# Patient Record
Sex: FEMALE | Race: BLACK OR AFRICAN AMERICAN | Employment: FULL TIME | URBAN - METROPOLITAN AREA
[De-identification: names, ages, dates, MRNs, and addresses within clinical notes are randomized per-mention and may not be internally consistent; named-entity substitution may affect disease eponyms.]

---

## 2020-01-31 LAB
CHLAMYDIA, EXTERNAL: NORMAL
HBSAG, EXTERNAL: NORMAL
HIV, EXTERNAL: NORMAL
N. GONORRHEA, EXTERNAL: NORMAL
RPR, EXTERNAL: NORMAL
RUBELLA, EXTERNAL: NORMAL
TYPE, ABO & RH, EXTERNAL: NORMAL

## 2020-08-14 LAB — GRBS, EXTERNAL: NORMAL

## 2020-08-24 ENCOUNTER — ANESTHESIA (OUTPATIENT)
Dept: LABOR AND DELIVERY | Age: 30
End: 2020-08-24
Payer: COMMERCIAL

## 2020-08-24 ENCOUNTER — ANESTHESIA EVENT (OUTPATIENT)
Dept: LABOR AND DELIVERY | Age: 30
End: 2020-08-24
Payer: COMMERCIAL

## 2020-08-24 ENCOUNTER — HOSPITAL ENCOUNTER (INPATIENT)
Age: 30
LOS: 3 days | Discharge: HOME | End: 2020-08-27
Attending: OBSTETRICS & GYNECOLOGY | Admitting: OBSTETRICS & GYNECOLOGY
Payer: COMMERCIAL

## 2020-08-24 DIAGNOSIS — R52 PAIN: Primary | ICD-10-CM

## 2020-08-24 PROBLEM — R10.9 ABDOMINAL PAIN DURING PREGNANCY, THIRD TRIMESTER: Status: ACTIVE | Noted: 2020-08-24

## 2020-08-24 PROBLEM — O26.893 ABDOMINAL PAIN DURING PREGNANCY, THIRD TRIMESTER: Status: ACTIVE | Noted: 2020-08-24

## 2020-08-24 LAB
ERYTHROCYTE [DISTWIDTH] IN BLOOD BY AUTOMATED COUNT: 14.1 % (ref 11.5–14.5)
HCT VFR BLD AUTO: 35.7 % (ref 35–47)
HGB BLD-MCNC: 11.4 G/DL (ref 11.5–16)
MCH RBC QN AUTO: 27.7 PG (ref 26–34)
MCHC RBC AUTO-ENTMCNC: 31.9 G/DL (ref 30–36.5)
MCV RBC AUTO: 86.7 FL (ref 80–99)
NRBC # BLD: 0 K/UL (ref 0–0.01)
NRBC BLD-RTO: 0 PER 100 WBC
PLATELET # BLD AUTO: 249 K/UL (ref 150–400)
PMV BLD AUTO: 10.6 FL (ref 8.9–12.9)
RBC # BLD AUTO: 4.12 M/UL (ref 3.8–5.2)
WBC # BLD AUTO: 12.9 K/UL (ref 3.6–11)

## 2020-08-24 PROCEDURE — 99283 EMERGENCY DEPT VISIT LOW MDM: CPT

## 2020-08-24 PROCEDURE — 85027 COMPLETE CBC AUTOMATED: CPT

## 2020-08-24 PROCEDURE — 75410000002 HC LABOR FEE PER 1 HR

## 2020-08-24 PROCEDURE — 74011000250 HC RX REV CODE- 250: Performed by: ANESTHESIOLOGY

## 2020-08-24 PROCEDURE — 65270000029 HC RM PRIVATE

## 2020-08-24 PROCEDURE — 36415 COLL VENOUS BLD VENIPUNCTURE: CPT

## 2020-08-24 PROCEDURE — 80053 COMPREHEN METABOLIC PANEL: CPT

## 2020-08-24 PROCEDURE — 74011250636 HC RX REV CODE- 250/636: Performed by: ANESTHESIOLOGY

## 2020-08-24 RX ORDER — FENTANYL CITRATE 50 UG/ML
100 INJECTION, SOLUTION INTRAMUSCULAR; INTRAVENOUS ONCE
Status: ACTIVE | OUTPATIENT
Start: 2020-08-24 | End: 2020-08-25

## 2020-08-24 RX ORDER — SODIUM CHLORIDE 0.9 % (FLUSH) 0.9 %
5-40 SYRINGE (ML) INJECTION EVERY 8 HOURS
Status: DISCONTINUED | OUTPATIENT
Start: 2020-08-25 | End: 2020-08-27 | Stop reason: HOSPADM

## 2020-08-24 RX ORDER — FENTANYL/BUPIVACAINE/NS/PF 2-1250MCG
1-16 PREFILLED PUMP RESERVOIR EPIDURAL CONTINUOUS
Status: DISCONTINUED | OUTPATIENT
Start: 2020-08-24 | End: 2020-08-27 | Stop reason: HOSPADM

## 2020-08-24 RX ORDER — EPHEDRINE SULFATE/0.9% NACL/PF 50 MG/5 ML
SYRINGE (ML) INTRAVENOUS
Status: DISPENSED
Start: 2020-08-24 | End: 2020-08-25

## 2020-08-24 RX ORDER — EPHEDRINE SULFATE/0.9% NACL/PF 50 MG/5 ML
10 SYRINGE (ML) INTRAVENOUS
Status: DISCONTINUED | OUTPATIENT
Start: 2020-08-24 | End: 2020-08-25 | Stop reason: HOSPADM

## 2020-08-24 RX ORDER — SODIUM CHLORIDE 0.9 % (FLUSH) 0.9 %
5-40 SYRINGE (ML) INJECTION AS NEEDED
Status: DISCONTINUED | OUTPATIENT
Start: 2020-08-24 | End: 2020-08-27 | Stop reason: HOSPADM

## 2020-08-24 RX ORDER — LIDOCAINE HYDROCHLORIDE AND EPINEPHRINE 15; 5 MG/ML; UG/ML
INJECTION, SOLUTION EPIDURAL AS NEEDED
Status: DISCONTINUED | OUTPATIENT
Start: 2020-08-24 | End: 2020-08-25 | Stop reason: HOSPADM

## 2020-08-24 RX ORDER — SODIUM CHLORIDE, SODIUM LACTATE, POTASSIUM CHLORIDE, CALCIUM CHLORIDE 600; 310; 30; 20 MG/100ML; MG/100ML; MG/100ML; MG/100ML
100 INJECTION, SOLUTION INTRAVENOUS CONTINUOUS
Status: DISCONTINUED | OUTPATIENT
Start: 2020-08-24 | End: 2020-08-25 | Stop reason: HOSPADM

## 2020-08-24 RX ORDER — NALOXONE HYDROCHLORIDE 0.4 MG/ML
0.4 INJECTION, SOLUTION INTRAMUSCULAR; INTRAVENOUS; SUBCUTANEOUS AS NEEDED
Status: DISCONTINUED | OUTPATIENT
Start: 2020-08-24 | End: 2020-08-25 | Stop reason: HOSPADM

## 2020-08-24 RX ORDER — BUPIVACAINE HYDROCHLORIDE 2.5 MG/ML
25 INJECTION, SOLUTION EPIDURAL; INFILTRATION; INTRACAUDAL ONCE
Status: ACTIVE | OUTPATIENT
Start: 2020-08-24 | End: 2020-08-25

## 2020-08-24 RX ORDER — FENTANYL/BUPIVACAINE/NS/PF 2-1250MCG
PREFILLED PUMP RESERVOIR EPIDURAL
Status: COMPLETED
Start: 2020-08-24 | End: 2020-08-25

## 2020-08-24 RX ORDER — BUPIVACAINE HYDROCHLORIDE 2.5 MG/ML
INJECTION, SOLUTION EPIDURAL; INFILTRATION; INTRACAUDAL
Status: COMPLETED | OUTPATIENT
Start: 2020-08-24 | End: 2020-08-24

## 2020-08-24 RX ORDER — NIFEDIPINE 30 MG/1
30 TABLET, EXTENDED RELEASE ORAL DAILY
COMMUNITY

## 2020-08-24 RX ADMIN — Medication 10 ML/HR: at 22:48

## 2020-08-24 RX ADMIN — LIDOCAINE HYDROCHLORIDE,EPINEPHRINE BITARTRATE 2 ML: 15; .005 INJECTION, SOLUTION EPIDURAL; INFILTRATION; INTRACAUDAL; PERINEURAL at 22:33

## 2020-08-24 RX ADMIN — BUPIVACAINE HYDROCHLORIDE 2 ML: 2.5 INJECTION, SOLUTION EPIDURAL; INFILTRATION; INTRACAUDAL; PERINEURAL at 22:32

## 2020-08-24 RX ADMIN — BUPIVACAINE HYDROCHLORIDE 2 ML: 2.5 INJECTION, SOLUTION EPIDURAL; INFILTRATION; INTRACAUDAL; PERINEURAL at 22:30

## 2020-08-24 RX ADMIN — SODIUM CHLORIDE, SODIUM LACTATE, POTASSIUM CHLORIDE, AND CALCIUM CHLORIDE 100 ML/HR: 600; 310; 30; 20 INJECTION, SOLUTION INTRAVENOUS at 21:45

## 2020-08-24 RX ADMIN — SODIUM CHLORIDE, SODIUM LACTATE, POTASSIUM CHLORIDE, AND CALCIUM CHLORIDE 100 ML/HR: 600; 310; 30; 20 INJECTION, SOLUTION INTRAVENOUS at 23:00

## 2020-08-24 RX ADMIN — LIDOCAINE HYDROCHLORIDE,EPINEPHRINE BITARTRATE 2 ML: 15; .005 INJECTION, SOLUTION EPIDURAL; INFILTRATION; INTRACAUDAL; PERINEURAL at 22:32

## 2020-08-25 LAB
ALBUMIN SERPL-MCNC: 3 G/DL (ref 3.5–5)
ALBUMIN/GLOB SERPL: 0.7 {RATIO} (ref 1.1–2.2)
ALP SERPL-CCNC: 158 U/L (ref 45–117)
ALT SERPL-CCNC: 17 U/L (ref 12–78)
ANION GAP SERPL CALC-SCNC: 10 MMOL/L (ref 5–15)
AST SERPL-CCNC: 21 U/L (ref 15–37)
BILIRUB SERPL-MCNC: 0.5 MG/DL (ref 0.2–1)
BUN SERPL-MCNC: 7 MG/DL (ref 6–20)
BUN/CREAT SERPL: 11 (ref 12–20)
CALCIUM SERPL-MCNC: 8.7 MG/DL (ref 8.5–10.1)
CHLORIDE SERPL-SCNC: 106 MMOL/L (ref 97–108)
CO2 SERPL-SCNC: 22 MMOL/L (ref 21–32)
CREAT SERPL-MCNC: 0.63 MG/DL (ref 0.55–1.02)
GLOBULIN SER CALC-MCNC: 4.3 G/DL (ref 2–4)
GLUCOSE SERPL-MCNC: 63 MG/DL (ref 65–100)
HEALTH STATUS, XMCV2T: NORMAL
POTASSIUM SERPL-SCNC: 3.8 MMOL/L (ref 3.5–5.1)
PROT SERPL-MCNC: 7.3 G/DL (ref 6.4–8.2)
SARS-COV-2, COV2: NOT DETECTED
SODIUM SERPL-SCNC: 138 MMOL/L (ref 136–145)
SOURCE, COVRS: NORMAL
SPECIMEN SOURCE, FCOV2M: NORMAL
SPECIMEN TYPE, XMCV1T: NORMAL

## 2020-08-25 PROCEDURE — 76060000078 HC EPIDURAL ANESTHESIA

## 2020-08-25 PROCEDURE — 74011250636 HC RX REV CODE- 250/636: Performed by: ANESTHESIOLOGY

## 2020-08-25 PROCEDURE — 77030021125

## 2020-08-25 PROCEDURE — 87635 SARS-COV-2 COVID-19 AMP PRB: CPT

## 2020-08-25 PROCEDURE — 77030031139 HC SUT VCRL2 J&J -A

## 2020-08-25 PROCEDURE — 10907ZC DRAINAGE OF AMNIOTIC FLUID, THERAPEUTIC FROM PRODUCTS OF CONCEPTION, VIA NATURAL OR ARTIFICIAL OPENING: ICD-10-PCS | Performed by: OBSTETRICS & GYNECOLOGY

## 2020-08-25 PROCEDURE — 74011000250 HC RX REV CODE- 250

## 2020-08-25 PROCEDURE — 74011250636 HC RX REV CODE- 250/636: Performed by: OBSTETRICS & GYNECOLOGY

## 2020-08-25 PROCEDURE — 75410000002 HC LABOR FEE PER 1 HR

## 2020-08-25 PROCEDURE — 75410000003 HC RECOV DEL/VAG/CSECN EA 0.5 HR

## 2020-08-25 PROCEDURE — 77030019905 HC CATH URETH INTMIT MDII -A

## 2020-08-25 PROCEDURE — 65410000002 HC RM PRIVATE OB

## 2020-08-25 PROCEDURE — 74011250637 HC RX REV CODE- 250/637: Performed by: OBSTETRICS & GYNECOLOGY

## 2020-08-25 PROCEDURE — 00HU33Z INSERTION OF INFUSION DEVICE INTO SPINAL CANAL, PERCUTANEOUS APPROACH: ICD-10-PCS | Performed by: ANESTHESIOLOGY

## 2020-08-25 PROCEDURE — 75410000000 HC DELIVERY VAGINAL/SINGLE

## 2020-08-25 PROCEDURE — 0KQM0ZZ REPAIR PERINEUM MUSCLE, OPEN APPROACH: ICD-10-PCS | Performed by: OBSTETRICS & GYNECOLOGY

## 2020-08-25 RX ORDER — IBUPROFEN 400 MG/1
800 TABLET ORAL EVERY 8 HOURS
Status: DISCONTINUED | OUTPATIENT
Start: 2020-08-25 | End: 2020-08-27 | Stop reason: HOSPADM

## 2020-08-25 RX ORDER — OXYCODONE AND ACETAMINOPHEN 5; 325 MG/1; MG/1
1 TABLET ORAL
Status: DISCONTINUED | OUTPATIENT
Start: 2020-08-25 | End: 2020-08-27 | Stop reason: HOSPADM

## 2020-08-25 RX ORDER — NIFEDIPINE 30 MG/1
30 TABLET, EXTENDED RELEASE ORAL DAILY
Status: DISCONTINUED | OUTPATIENT
Start: 2020-08-26 | End: 2020-08-25

## 2020-08-25 RX ORDER — NIFEDIPINE 30 MG/1
30 TABLET, EXTENDED RELEASE ORAL DAILY
Status: DISCONTINUED | OUTPATIENT
Start: 2020-08-25 | End: 2020-08-27 | Stop reason: HOSPADM

## 2020-08-25 RX ORDER — OXYTOCIN/0.9 % SODIUM CHLORIDE 30/500 ML
333 PLASTIC BAG, INJECTION (ML) INTRAVENOUS ONCE
Status: COMPLETED | OUTPATIENT
Start: 2020-08-25 | End: 2020-08-25

## 2020-08-25 RX ORDER — HYDROCORTISONE ACETATE PRAMOXINE HCL 2.5; 1 G/100G; G/100G
CREAM TOPICAL AS NEEDED
Status: DISCONTINUED | OUTPATIENT
Start: 2020-08-25 | End: 2020-08-27 | Stop reason: HOSPADM

## 2020-08-25 RX ORDER — ZOLPIDEM TARTRATE 5 MG/1
5 TABLET ORAL
Status: DISCONTINUED | OUTPATIENT
Start: 2020-08-25 | End: 2020-08-27 | Stop reason: HOSPADM

## 2020-08-25 RX ORDER — OXYTOCIN/RINGER'S LACTATE 20/1000 ML
125-500 PLASTIC BAG, INJECTION (ML) INTRAVENOUS ONCE
Status: ACTIVE | OUTPATIENT
Start: 2020-08-25 | End: 2020-08-26

## 2020-08-25 RX ORDER — ACETAMINOPHEN 325 MG/1
650 TABLET ORAL
Status: DISCONTINUED | OUTPATIENT
Start: 2020-08-25 | End: 2020-08-27 | Stop reason: HOSPADM

## 2020-08-25 RX ORDER — NALOXONE HYDROCHLORIDE 0.4 MG/ML
0.4 INJECTION, SOLUTION INTRAMUSCULAR; INTRAVENOUS; SUBCUTANEOUS AS NEEDED
Status: DISCONTINUED | OUTPATIENT
Start: 2020-08-25 | End: 2020-08-27 | Stop reason: HOSPADM

## 2020-08-25 RX ADMIN — Medication 10 ML/HR: at 06:15

## 2020-08-25 RX ADMIN — OXYTOCIN 19980 MILLI-UNITS/HR: 10 INJECTION, SOLUTION INTRAMUSCULAR; INTRAVENOUS at 13:03

## 2020-08-25 RX ADMIN — NIFEDIPINE 30 MG: 30 TABLET, FILM COATED, EXTENDED RELEASE ORAL at 15:27

## 2020-08-25 RX ADMIN — IBUPROFEN 800 MG: 400 TABLET, FILM COATED ORAL at 16:36

## 2020-08-25 RX ADMIN — SODIUM CHLORIDE, SODIUM LACTATE, POTASSIUM CHLORIDE, AND CALCIUM CHLORIDE 100 ML/HR: 600; 310; 30; 20 INJECTION, SOLUTION INTRAVENOUS at 05:02

## 2020-08-25 NOTE — ED PROVIDER NOTES
26 yo G1 at 38.2 weeks c/o painful contractions since 2p Chief Complaint Patient presents with  Abdominal Pain  Contractions No past medical history on file. No past surgical history on file. Family History:  
Problem Relation Age of Onset  Hypertension Mother  Diabetes Maternal Aunt Social History Socioeconomic History  Marital status: SINGLE Spouse name: Not on file  Number of children: Not on file  Years of education: Not on file  Highest education level: Not on file Occupational History  Not on file Social Needs  Financial resource strain: Not on file  Food insecurity Worry: Not on file Inability: Not on file  Transportation needs Medical: Not on file Non-medical: Not on file Tobacco Use  Smoking status: Never Smoker  Smokeless tobacco: Never Used Substance and Sexual Activity  Alcohol use: Yes Comment: ocassionally  Drug use: No  
 Sexual activity: Yes  
  Partners: Male Birth control/protection: None, Pill Lifestyle  Physical activity Days per week: Not on file Minutes per session: Not on file  Stress: Not on file Relationships  Social connections Talks on phone: Not on file Gets together: Not on file Attends Sikhism service: Not on file Active member of club or organization: Not on file Attends meetings of clubs or organizations: Not on file Relationship status: Not on file  Intimate partner violence Fear of current or ex partner: Not on file Emotionally abused: Not on file Physically abused: Not on file Forced sexual activity: Not on file Other Topics Concern  Not on file Social History Narrative  Not on file ALLERGIES: Peanut Review of Systems Constitutional: Negative for fatigue and fever. HENT: Negative for congestion, sneezing and sore throat. Eyes: Negative for visual disturbance. Respiratory: Negative for cough, shortness of breath and wheezing. Cardiovascular: Negative for chest pain and leg swelling. Gastrointestinal: Positive for abdominal pain. Negative for abdominal distention, constipation, diarrhea, nausea and vomiting. Genitourinary: Negative for dysuria, flank pain, frequency, vaginal bleeding, vaginal discharge and vaginal pain. Musculoskeletal: Positive for back pain. Neurological: Negative for headaches. Psychiatric/Behavioral: Negative for confusion. There were no vitals filed for this visit. Physical Exam 
Vitals signs and nursing note reviewed. Exam conducted with a chaperone present. HENT:  
   Head: Normocephalic and atraumatic. Eyes:  
   General: No scleral icterus. Extraocular Movements: Extraocular movements intact. Pupils: Pupils are equal, round, and reactive to light. Cardiovascular:  
   Rate and Rhythm: Normal rate and regular rhythm. Heart sounds: Normal heart sounds. Pulmonary:  
   Effort: Pulmonary effort is normal.  
   Breath sounds: Normal breath sounds. No wheezing. Abdominal:  
   General: Bowel sounds are normal. There is abdominal bruit. Palpations: Abdomen is soft. Tenderness: There is no guarding or rebound. Genitourinary: 
   Vagina: Normal. No vaginal discharge or bleeding. Cervix: Dilated. Uterus: Enlarged. Not tender. Neurological:  
   General: No focal deficit present. Mental Status: She is alert. Psychiatric:     
   Mood and Affect: Mood normal.  
 
  
 
MDM Number of Diagnoses or Management Options Diagnosis management comments: History of Present Illness Patient Identification Kiana Vega is a 27 y.o. female. Patient information was obtained from {info source:28935}. History/Exam limitations: {limitations:22734::\"none\"}. Patient presented to the Emergency Department {arrival:88657} Chief Complaint Abdominal Pain and Contractions Patient presents for evaluation of abdominal pain. Onset of symptoms was {onset:69753} with {clinical course - history:17::\"unchanged\"} course since that time. The pain is located {abd locations:34243} {abdomen radiation:616}. The pain is rated as {severity:5014}. The pain is made worse by {ed abd  aggravating factors:94005} and is relieved by {ed abd pain alleviating facts:04008}. The patient also complains of {abdomen pain associated symptoms:621}. The patient denies {abdomen pain associated symptoms:621}. The past workup has included {ed abd pain workup:82835}. The pertinent past history includes {ed abd pain pmh:54000}. The patient denies {ed abd pain pmh:27159}. Home care consisted of *** with {relief:02880} relief. No past medical history on file. Review of patient's family history indicates: 
Problem: Hypertension Relation: Mother Age of Onset: (Not Specified) Problem: Diabetes Relation: Maternal Aunt Age of Onset: (Not Specified) 
(17437) -- Peanut -- Swelling Social History Socioeconomic History Marital status: SINGLE Spouse name: Not on file Number of children: Not on file Years of education: Not on file Highest education level: Not on file Occupational History Not on file Social Needs Financial resource strain: Not on file Food insecurity Worry: Not on file Inability: Not on file Transportation needs Medical: Not on file Non-medical: Not on file Tobacco Use Smoking status: Never Smoker Smokeless tobacco: Never Used Substance and Sexual Activity Alcohol use: Yes Comment: ocassionally Drug use: No 
    Sexual activity: Yes 
      Partners: Male Birth control/protection: None, Pill Lifestyle Physical activity Days per week: Not on file Minutes per session: Not on file Stress: Not on file Relationships Social connections Talks on phone: Not on file Gets together: Not on file Attends Moravian service: Not on file Active member of club or organization: Not on file Attends meetings of clubs or organizations: Not on file Relationship status: Not on file Intimate partner violence Fear of current or ex partner: Not on file Emotionally abused: Not on file Physically abused: Not on file Forced sexual activity: Not on file Other Topics Concerns: 
      Not on file Social History Narrative Not on file Review of Systems {ROS - complete:40333} Physical Exam 
 
There were no vitals taken for this visit. {Exam, Complete:82085} ED Course Studies: {studies:72309} Records Reviewed: {reviewed:49143} Treatments: {ED GI Tx list:07314} Consultations: {consultations:63036} Disposition: {ED Disposition:99525} Amount and/or Complexity of Data Reviewed Clinical lab tests: ordered Decide to obtain previous medical records or to obtain history from someone other than the patient: yes Review and summarize past medical records: yes Discuss the patient with other providers: yes Independent visualization of images, tracings, or specimens: yes Risk of Complications, Morbidity, and/or Mortality Presenting problems: moderate Diagnostic procedures: low Management options: moderate General comments: Active labor CHTN Critical Care Total time providing critical care: < 30 minutes Patient Progress Patient progress: stable Admit to L&D Procedures [unfilled]

## 2020-08-25 NOTE — ANESTHESIA PROCEDURE NOTES
Epidural Block    Performed by: Kuldeep Gregory MD  Authorized by: Kuldeep Gregory MD     Pre-Procedure  Indication: labor epidural    Preanesthetic Checklist: patient identified, risks and benefits discussed, anesthesia consent, site marked, patient being monitored, timeout performed and anesthesia consent      Epidural:   Patient position:  Left lateral decubitus  Prep region:  Lumbar  Prep: Chlorhexidine    Location:  L2-3    Needle and Epidural Catheter:   Needle Type:  Tuohy  Needle Gauge:  17 G  Injection Technique:  Loss of resistance using saline  Attempts:  3 or more  Catheter Size:  19 G  Events: no blood with aspiration, no cerebrospinal fluid with aspiration, no paresthesia and negative aspiration test    Test Dose:  Bupivacaine 0.25% and negative    Assessment:   Catheter Secured:  Tegaderm and tape  Insertion:  Uncomplicated  Patient tolerance:  Patient tolerated the procedure well with no immediate complications  Difficult placement.

## 2020-08-25 NOTE — PROGRESS NOTES
0740 Bedside report received from Lissette Vanegas at bedside. AROM for moderate amount of clear fluid. SVE 9/C/0. Turned on left side. 1000 Turned on right side. 1030 Straight cath'd for 500cc of urine. Practice push. Able to reduce lip. Patient not feeling pressure. Plan to labor down for one hour. 1134 Start pushing. 1156 Pushing on left side. 1204 Pushing on right side. 1212 Pushing in semifowlers. 26 Dr. Erendira Garcia at bedside for delivery. 1  of live male infant.

## 2020-08-25 NOTE — PROGRESS NOTES
Labor Progress Note  Patient seen, fetal heart rate and contraction pattern evaluated, patient examined. Comfortable with epidural.  No data found. Physical Exam:  Cervical Exam:  9 cm dilated    100% effaced    0 station    Membranes:  Artificial Rupture of Membranes;  Amniotic Fluid: medium amount of clear fluid  Uterine Activity: Frequency: Every 4 minutes  Fetal Heart Rate: Reactive  Baseline: 135s per minute  Variability: moderate  Accelerations: yes  Decelerations: none    Assessment/Plan:  Reassuring fetal status, Labor  Progressing normally, Continue plan for vaginal delivery

## 2020-08-25 NOTE — ROUTINE PROCESS
1555:TRANSFER - IN REPORT: 
 
Verbal report received from RIGOBERTO Ventura(name) on Huma Billy  being received from L+D(unit) for routine progression of care Report consisted of patients Situation, Background, Assessment and  
Recommendations(SBAR). Information from the following report(s) SBAR was reviewed with the receiving nurse. Opportunity for questions and clarification was provided. Assessment completed upon patients arrival to unit and care assumed.

## 2020-08-25 NOTE — PROGRESS NOTES
~2105: The patient arrived ambulatory from home with reports of contractions that started at 0500. The patient denies any vaginal bleeding or leaking of fluid and does report positive fetal movement. The patient and her  Philippe Ulloa are escorted to [facility].  ~2115: Dr Andre Santamaria is at the bedside speaking with the patient and her . SVE is 5/100. Orders are received to admit the patient as inpatient.   ~2125: The patient and her  are escorted to L&D 7.  [email or web address]: Covid test is collected without difficulty. ~0889: Bedside and Verbal shift change report given to RIGOBERTO Ventura RN by ALEX Terrazas RN. Report included the following information SBAR, MAR and Recent Results.

## 2020-08-25 NOTE — PROCEDURES
Delivery Note    Obstetrician:  Clint Guillory DO    Assistant: none    Pre-Delivery Diagnosis: Term pregnancy, Spontaneous labor and Single fetus    Post-Delivery Diagnosis: Living  infant(s) and Male    Intrapartum Event: None    Procedure: Spontaneous vaginal delivery    Epidural: YES    Monitor:  Fetal Heart Tones - External and Uterine Contractions - External    Indications for instrumental delivery: none    Estimated Blood Loss: 300cc    Episiotomy: none    Laceration(s):  2nd degree    Laceration(s) repair: YES    Presentation: Cephalic    Fetal Description: saunders    Fetal Position: Occiput Anterior    Birth Weight: see delivery summary    Birth Length: see delivery summary    Apgar - One Minute: 9    Apgar - Five Minutes: 9    Umbilical Cord: 3 vessels present  Specimens: placenta- discarded  Complications:  none           Cord Blood Results:   Information for the patient's :  Brant Yusuf [[id number]]   No results found for: PCTABR, PCTDIG, BILI, ABORH     Prenatal Labs:     Lab Results   Component Value Date/Time    HBsAg, External Neg 2020    HIV, External Neg 2020    Rubella, External Immune 2020    RPR, External NR 2020    Gonorrhea, External Neg 2020    Chlamydia, External Neg 2020    GrBStrep, External Neg 2020        Attending Attestation: I performed the procedure. Pt pushed with good maternal effort and fetal head delivered and body quickly followed behind. Spontaneous expression of placenta which was intact. 3-0 vicryl used to repair 2nd degree and 2-0 was used for deep reinforcement.

## 2020-08-25 NOTE — H&P
Labor and Delivery Admission Note  8/24/2020    27 y.o., , female, G1 P 0 at 38.2 weeks and an Estimated Date of Delivery: None noted. by dates and US presents with contractions at 2128  Reports good fetal movement, no bleeding, and has strong contractions. Deneis GUILLERMO, vis changes, SOB, cough, n/v/d/c, back pain and swelling. PNC complicated only by [place] well controlled on Nifedipine. Reports cx 2-3 at last office exam.  Michelle unknown    PNC: Blood type: O            RH: pos            Rubella: immune            SVII serology: neg             GBS status: NEG             HIV: Neg             HBsAg: Neg    No past medical history on file. No past surgical history on file. OB/GYN: G1  Meds:   No current facility-administered medications for this encounter. Allergies: Allergies   Allergen Reactions    Peanut Swelling     Pertinent ROS: All pertinent reviewed in HPI and neg unless otherwise noted.    Family History   Problem Relation Age of Onset    Hypertension Mother     Diabetes Maternal Aunt      Social History     Socioeconomic History    Marital status: SINGLE     Spouse name: Not on file    Number of children: Not on file    Years of education: Not on file    Highest education level: Not on file   Occupational History    Not on file   Social Needs    Financial resource strain: Not on file    Food insecurity     Worry: Not on file     Inability: Not on file    Transportation needs     Medical: Not on file     Non-medical: Not on file   Tobacco Use    Smoking status: Never Smoker    Smokeless tobacco: Never Used   Substance and Sexual Activity    Alcohol use: Yes     Comment: ocassionally     Drug use: No    Sexual activity: Yes     Partners: Male     Birth control/protection: None, Pill   Lifestyle    Physical activity     Days per week: Not on file     Minutes per session: Not on file    Stress: Not on file   Relationships    Social connections     Talks on phone: Not on file     Gets together: Not on file     Attends Jehovah's witness service: Not on file     Active member of club or organization: Not on file     Attends meetings of clubs or organizations: Not on file     Relationship status: Not on file    Intimate partner violence     Fear of current or ex partner: Not on file     Emotionally abused: Not on file     Physically abused: Not on file     Forced sexual activity: Not on file   Other Topics Concern    Not on file   Social History Narrative    Not on file       OBJECTIVE:  Gravid , female NAD  No data recorded. There were no vitals taken for this visit. Labs:  No results found for: WBC    Exam:  HEENT:  normal   Lungs:  clear  Cor:  RRR  Abdomen:  Fundal height 38                    Soft between UC                    Clinical EFW 7lbs  Fetal heart rate tracin's  Contraction pattern: q3-5  Cervix:  5/C/_2/Vtx  Fluid:  Intact  Pelvimetry:  AP-good                      Arch- adequate                      Sidewalls- adequate                      Pelvis feels adequate for fetus.     Impression:    26 yo G1 at 38.2 weeks  Labor  GBS neg  CHTN on Nifedipine  COVID unk    Plan: Anticipate             Plan reviewed            Questions answered            Epidural as desired           Jane Mckeon MD

## 2020-08-25 NOTE — H&P
Labor and Delivery Admission Note  8/24/2020    27 y.o., , female, G1 P 0 at 38.2 weeks and an Estimated Date of Delivery: None noted. by dates and US presents with contractions at 2128  Reports good fetal movement, no bleeding, and has strong contractions. Deneis GUILLERMO, vis changes, SOB, cough, n/v/d/c, back pain and swelling. PNC complicated only by [facility] well controlled on Nifedipine. Reports cx 2-3 at last office exam.  Venda Flavors unknown    PNC: Blood type: O            RH: pos            Rubella: immune            SVII serology: neg             GBS status: NEG             HIV: Neg             HBsAg: Neg    No past medical history on file. No past surgical history on file. OB/GYN: G1  Meds:   No current facility-administered medications for this encounter. Allergies: Allergies   Allergen Reactions    Peanut Swelling     Pertinent ROS: All pertinent reviewed in HPI and neg unless otherwise noted.    Family History   Problem Relation Age of Onset    Hypertension Mother     Diabetes Maternal Aunt      Social History     Socioeconomic History    Marital status: SINGLE     Spouse name: Not on file    Number of children: Not on file    Years of education: Not on file    Highest education level: Not on file   Occupational History    Not on file   Social Needs    Financial resource strain: Not on file    Food insecurity     Worry: Not on file     Inability: Not on file    Transportation needs     Medical: Not on file     Non-medical: Not on file   Tobacco Use    Smoking status: Never Smoker    Smokeless tobacco: Never Used   Substance and Sexual Activity    Alcohol use: Yes     Comment: ocassionally     Drug use: No    Sexual activity: Yes     Partners: Male     Birth control/protection: None, Pill   Lifestyle    Physical activity     Days per week: Not on file     Minutes per session: Not on file    Stress: Not on file   Relationships    Social connections     Talks on phone: Not on file     Gets together: Not on file     Attends Latter day service: Not on file     Active member of club or organization: Not on file     Attends meetings of clubs or organizations: Not on file     Relationship status: Not on file    Intimate partner violence     Fear of current or ex partner: Not on file     Emotionally abused: Not on file     Physically abused: Not on file     Forced sexual activity: Not on file   Other Topics Concern    Not on file   Social History Narrative    Not on file       OBJECTIVE:  Gravid , female NAD  No data recorded. There were no vitals taken for this visit. Labs:  No results found for: WBC    Exam:  HEENT:  normal   Lungs:  clear  Cor:  RRR  Abdomen:  Fundal height 38                    Soft between UC                    Clinical EFW 7lbs  Fetal heart rate tracin's  Contraction pattern: q3-5  Cervix:  5/C/_2/Vtx  Fluid:  Intact  Pelvimetry:  AP-good                      Arch- adequate                      Sidewalls- adequate                      Pelvis feels adequate for fetus.     Impression:    28 yo G1 at 38.2 weeks  Labor  GBS neg  CHTN on Nifedipine  COVID unk    Plan: Anticipate             Plan reviewed            Questions answered            Epidural as desired           Amaryllis Dakins, MD

## 2020-08-26 PROCEDURE — 74011250637 HC RX REV CODE- 250/637: Performed by: OBSTETRICS & GYNECOLOGY

## 2020-08-26 PROCEDURE — 65410000002 HC RM PRIVATE OB

## 2020-08-26 RX ADMIN — IBUPROFEN 800 MG: 400 TABLET, FILM COATED ORAL at 19:43

## 2020-08-26 RX ADMIN — IBUPROFEN 800 MG: 400 TABLET, FILM COATED ORAL at 09:22

## 2020-08-26 RX ADMIN — ACETAMINOPHEN 650 MG: 325 TABLET ORAL at 13:07

## 2020-08-26 RX ADMIN — ACETAMINOPHEN 650 MG: 325 TABLET ORAL at 07:05

## 2020-08-26 RX ADMIN — NIFEDIPINE 30 MG: 30 TABLET, FILM COATED, EXTENDED RELEASE ORAL at 09:22

## 2020-08-26 RX ADMIN — IBUPROFEN 800 MG: 400 TABLET, FILM COATED ORAL at 00:35

## 2020-08-26 NOTE — ROUTINE PROCESS
Bedside shift change report given to Mechelle Harris RN (oncoming nurse) by Candelaria Siddiqui. Algie Collet, RN (offgoing nurse). Report included the following information SBAR, Kardex, Procedure Summary, Intake/Output, MAR and Recent Results.

## 2020-08-26 NOTE — PROGRESS NOTES
Progress Note                               Patient: Huma Cervantes MRN: [id number]  SSN: [id number]    YOB: 1990  Age: 27 y.o. Sex: female      Postpartum Day Number 1    Subjective:     Patient doing well postpartum without significant complaints. Voiding without difficulty. Patient reports normal lochia. . Breastfeeding: Yes     Objective:     Patient Vitals for the past 18 hrs:   Temp Pulse Resp BP   20 0255 97.6 °F (36.4 °C) 91 15 116/74   20 2044 97.7 °F (36.5 °C) (!) 109 15 137/86   20 1722 97.7 °F (36.5 °C) 76 16 129/76   20 1557 98.1 °F (36.7 °C) 80 16 132/75   20 1524 98.3 °F (36.8 °C) 96 14 119/79   20 1509  90  131/87   20 1454  84  131/87   20 1439  83  123/79   20 1424  84  114/70   20 1409  84  126/80        Temp (24hrs), Av.1 °F (36.7 °C), Min:97.6 °F (36.4 °C), Max:99.1 °F (37.3 °C)      Physical Exam:    General:   Patient without distress. Abdomen: Soft, fundus firm at level of umbilicus, nontender   Lower Extremities: Negative for swelling, cords, or tenderness. Lab/Data Review:  CBC:    Recent Labs     20  2145   WBC 12.9*   HGB 11.4*   HCT 35.7          Assessment and Plan:     Patient appears to be having an uncomplicated postpartum course. Continue routine perineal care and maternal education.   Boy- benign labs  circ today

## 2020-08-26 NOTE — ROUTINE PROCESS
1940: Bedside shift change report given to PA Preciado RN (oncoming nurse) by Rosenda Gusman. [facility] RN (offgoing nurse). Report included the following information SBAR.

## 2020-08-27 VITALS
DIASTOLIC BLOOD PRESSURE: 78 MMHG | SYSTOLIC BLOOD PRESSURE: 137 MMHG | HEIGHT: 70 IN | BODY MASS INDEX: 26.48 KG/M2 | WEIGHT: 185 LBS | HEART RATE: 98 BPM | OXYGEN SATURATION: 95 % | TEMPERATURE: 98.5 F | RESPIRATION RATE: 16 BRPM

## 2020-08-27 PROCEDURE — 74011250637 HC RX REV CODE- 250/637: Performed by: OBSTETRICS & GYNECOLOGY

## 2020-08-27 RX ORDER — IBUPROFEN 800 MG/1
800 TABLET ORAL
Qty: 30 TAB | Refills: 1 | Status: SHIPPED | OUTPATIENT
Start: 2020-08-27

## 2020-08-27 RX ORDER — OXYCODONE AND ACETAMINOPHEN 5; 325 MG/1; MG/1
1 TABLET ORAL
Qty: 10 TAB | Refills: 0 | Status: SHIPPED | OUTPATIENT
Start: 2020-08-27 | End: 2020-08-30

## 2020-08-27 RX ADMIN — NIFEDIPINE 30 MG: 30 TABLET, FILM COATED, EXTENDED RELEASE ORAL at 10:58

## 2020-08-27 RX ADMIN — IBUPROFEN 800 MG: 400 TABLET, FILM COATED ORAL at 06:43

## 2020-08-27 NOTE — PROGRESS NOTES
Post-Partum Day Number 2 Progress Note    Patient doing well post-partum without significant complaints. Voiding without difficulty, normal lochia. Vitals:    Patient Vitals for the past 24 hrs:   BP Temp Pulse Resp   20 0405 126/80 98.5 °F (36.9 °C) 84 15   20 2110 127/73 98.3 °F (36.8 °C) 79 16   20 1420 108/69 98.2 °F (36.8 °C) 83 16   20 0920 132/79 98.5 °F (36.9 °C) 96 16     Temp (24hrs), Av.4 °F (36.9 °C), Min:98.2 °F (36.8 °C), Max:98.5 °F (36.9 °C)      Vital signs stable, afebrile. Exam:  Patient without distress. Abdomen soft, fundus firm, nontender               Lower extremities- nontender without edema; no cords    Labs: No results found for this or any previous visit (from the past 24 hour(s)). Assessment and Plan:  Patient appears to be having uncomplicated post-partum course. Discharge today-instructions reviewed. [email or web address]

## 2020-08-27 NOTE — DISCHARGE INSTRUCTIONS
POSTPARTUM DISCHARGE INSTRUCTIONS       Name:  Tr Sepulveda  YOB: 1990  Admission Diagnosis:  Abdominal pain during pregnancy, third trimester [O26.893, R10.9]     Discharge Diagnosis:    Problem List as of 8/27/2020 Date Reviewed: 1/10/2015          Codes Class Noted - Resolved    Abdominal pain during pregnancy, third trimester ICD-10-CM: O26.893, R10.9  ICD-9-CM: 646.83, 789.00  8/24/2020 - Present        Chest pain ICD-10-CM: R07.9  ICD-9-CM: 786.50  1/10/2015 - Present        Syncope ICD-10-CM: R55  ICD-9-CM: 780.2  1/10/2015 - Present        Postprandial RUQ pain ICD-10-CM: R10.11  ICD-9-CM: 789.01  1/10/2015 - Present            Attending Physician:  Corina Frank MD    Delivery Type:  Vaginal Childbirth with Episiotomy, Laceration or Tear: What To Expect At [address] [place] [address] body will slowly heal in the next few weeks. It is easy to get too tired and overwhelmed during the first weeks after your baby is born. Changes in your hormones can shift your mood without warning. You may find it hard to meet the extra demands on your energy and time. Take it easy on yourself. Follow-up care is a key part of your treatment and safety. Be sure to make and go to all appointments, and call your doctor if you are having problems. It's also a good idea to know your test results and keep a list of the medicines you take. How can you care for yourself at home? Vaginal Bleeding and Cramps  · After delivery, you will have a bloody discharge from the vagina. This will turn pink within a week and then white or yellow after about 10 days. It may last for 2 to 4 weeks or longer, until the uterus has healed. Use pads instead of tampons until you stop bleeding. · Do not worry if you pass some blood clots, as long as they are smaller than a golf ball. If you have a tear or stitches in your vaginal area, change the pad at least every 4 hours to prevent soreness and infection.     · You may have cramps for the first few days after childbirth. These are normal and occur as the uterus shrinks to normal size. Take an over-the-counter pain medicine, such as acetaminophen (Tylenol), ibuprofen (Advil, Motrin), or naproxen (Aleve), for cramps. Read and follow all instructions on the label. Do not take aspirin, because it can cause more bleeding. Do not take acetaminophen (Tylenol) and other acetaminophen containing medications (i.e. Percocet) at the same time. Episiotomy, Lacerations or Tears  · If you have stitches, they will dissolve on their own and do not need to be removed. · Put ice or a cold pack on your painful area for 10 to 20 minutes at a time, several times a day, for the first few days. Put a thin cloth between the ice and your skin. · Sit in a few inches of warm water (sitz bath) 3 times a day and after bowel movements. The warm water helps with pain and itching. If you do not have a tub, a warm shower might help. Breast fullness  · Your breasts may overfill (engorge) in the first few days after delivery. To help milk flow and to relieve pain, warm your breasts in the shower or by using warm, moist towels before nursing. · If you are not nursing, do not put warmth on your breasts or touch your breasts. Wear a tight bra or sports bra and use ice until the fullness goes away. This usually takes 2 to 3 days. · Put ice or a cold pack on your breast after nursing to reduce swelling and pain. Put a thin cloth between the ice and your skin. Activity  · Eat a balanced diet. Do not try to lose weight by cutting calories. Keep taking your prenatal vitamins, or take a multivitamin. · Get as much rest as you can. Try to take naps when your baby sleeps during the day. · Get some exercise every day. But do not do any heavy exercise until your doctor says it is okay. · Wait until you are healed (about 4 to 6 weeks) before you have sexual intercourse.  Your doctor will tell you when it is okay to have sex. · Talk to your doctor about birth control. You can get pregnant even before your period returns. Also, you can get pregnant while you are breast-feeding. Mental Health  · Many women get the \"baby blues\" during the first few days after childbirth. You may lose sleep, feel irritable, and cry easily. You may feel happy one minute and sad the next. Hormone changes are one cause of these emotional changes. Also, the demands of a new baby, along with visits from relatives or other family needs, add to a mother's stress. The \"baby blues\" often peak around the fourth day. Then they ease up in less than 2 weeks. · If your moodiness or anxiety lasts for more than 2 weeks, or if you feel like life is not worth living, you may have postpartum depression. This is different for each mother. Some mothers with serious depression may worry intensely about their infant's well-being. Others may feel distant from their child. Some mothers might even feel that they might harm their baby. A mother may have signs of paranoia, wondering if someone is watching her. · With all the changes in your life, you may not know if you are depressed. Pregnancy sometimes causes changes in how you feel that are similar to the symptoms of depression. · Symptoms of depression include:  · Feeling sad or hopeless and losing interest in daily activities. These are the most common symptoms of depression. · Sleeping too much or not enough. · Feeling tired. You may feel as if you have no energy. · Eating too much or too little. · [employer] ([employer]I) offers a Warm line; Chat with the Expert phone sessions; Information and Articles about Pregnancy and Postpartum Mood Disorders; Comprehensive List of Free Support Groups; Knowledgeable local coordinators who will offer support, information, and resources; Guide to Resources on Simple-Fill; Calendar of events in the  mood disorders community;  Latest News and Research; and [facility] for [employer]. Remember - You are not alone; You are not to blame; With help, you will be well. 7-150-888-PPD(5396). WWW. POSTPARTUM. NET    · Writing or talking about death, such as writing suicide notes or talking about guns, knives, or pills. Keep the numbers for these national suicide hotlines: 2-788-839-TALK (6-677.763.4891) and 6-231-FNMYTHL (8-738.145.6974). If you or someone you know talks about suicide or feeling hopeless, get help right away. Constipation and Hemorrhoids  Drink plenty of fluids, enough so that your urine is light yellow or clear like water. If you have kidney, heart, or liver disease and have to limit fluids, talk with your doctor before you increase the amount of fluids you drink. · Eat plenty of fiber each day. Have a bran muffin or bran cereal for breakfast, and try eating a piece of fruit for a mid-afternoon snack. · For painful, itchy hemorrhoids, put ice or a cold pack on the area several times a day for 10 minutes at a time. Follow this by putting a warm compress on the area for another 10 to 20 minutes or by sitting in a shallow, warm bath. When should you call for help? Call 911 anytime you think you may need emergency care. For example, call if:  · You are thinking of hurting yourself, your baby, or anyone else. · You passed out (lost consciousness). · You have symptoms of a blood clot in your lung (called a pulmonary embolism). These may include:  · Sudden chest pain. · Trouble breathing. · Coughing up blood. Call your doctor now or seek immediate medical care if:  · You have severe vaginal bleeding. · You are soaking through a pad each hour for 2 or more hours. · Your vaginal bleeding seems to be getting heavier or is still bright red 4 days after delivery. · You are dizzy or lightheaded, or you feel like you may faint. · You are vomiting or cannot keep fluids down. · You have a fever.   · You have new or more belly pain.  · You pass tissue (not just blood). · Your vaginal discharge smells bad. · Your belly feels tender or full and hard. · Your breasts are continuously painful or red. · You feel sad, anxious, or hopeless for more than a few days. · You have sudden, severe pain in your belly. · You have symptoms of a blood clot in your leg (called a deep vein thrombosis), such as:  · Pain in your calf, back of the knee, thigh, or groin. · Redness and swelling in your leg or groin. · You have symptoms of preeclampsia, such as:  · Sudden swelling of your face, hands, or feet. · New vision problems (such as dimness or blurring). · A severe headache. · Your blood pressure is higher than it should be or rises suddenly. · You have new nausea or vomiting. Watch closely for changes in your health, and be sure to contact your doctor if you have any problems. Additional Information:  Postpartum Support    PARENTS:  Are you feeling sad or depressed? Is it difficult for you to enjoy yourself? Do you feel more irritable or tense? Do you feel anxious or panicky? Are you having difficulty bonding with your baby? Do you feel as if you are \"out of control\" or \"going crazy\"? Are you worried that you might hurt your baby or yourself? FAMILIES: Do you worry that something is wrong but don't know how to help? Do you think that your partner or spouse is having problems coping? Are you worried that it may never get better? While many women experience some mild mood change or \"the blues\" during or after the birth of a child, 1 in 9 women experience more significant symptoms of depression or anxiety. 1 in 10 Dads become depressed during the first year. Things you can do  Being a good parent includes taking care of yourself. If you take care of yourself, you will be able to take better care of your baby and your family.    · Talk to a counselor or healthcare provider who has training in  mood and anxiety problems. · Learn as much as you can about pregnancy and postpartum depression and anxiety. · Get support from family and friends. Ask for help when you need it. · Join a support group in your area or online. · Keep active by walking, stretching or whatever form of exercise helps you to feel better. · Get enough rest and time for yourself. · Eat a healthy diet. · Don't give up! It may take more than one try to get the right help you need. These are general instructions for a healthy lifestyle:    No smoking/ No tobacco products/ Avoid exposure to second hand smoke    Surgeon General's Warning:  Quitting smoking now greatly reduces serious risk to your health. Obesity, smoking, and sedentary lifestyle greatly increases your risk for illness    A healthy diet, regular physical exercise & weight monitoring are important for maintaining a healthy lifestyle    Recognize signs and symptoms of STROKE:    F-face looks uneven    A-arms unable to move or move unevenly    S-speech slurred or non-existent    T-time-call 911 as soon as signs and symptoms begin - DO NOT go       back to bed or wait to see if you get better - TIME IS BRAIN. I have had the opportunity to make my options or choices for discharge. I have received and understand these instructions.

## 2020-08-27 NOTE — DISCHARGE SUMMARY
Obstetrical Discharge Summary     Name: Mary Torres MRN: [id number]  SSN: [id number]    YOB: 1990  Age: 27 y.o. Sex: female      Allergies: Peanut    Admit Date: 2020    Discharge Date: 2020     Admitting Physician: Akilah Chopra MD     Attending Physician:  Issa Matthews MD     * Admission Diagnoses: Abdominal pain during pregnancy, third trimester [O26.893, R10.9]    * Discharge Diagnoses:   Information for the patient's :  Natasha Pearl [[id number]]   Delivery of a 3.655 kg male infant via Vaginal, Spontaneous on 2020 at 12:57 PM  by Claudia Hernandez. Apgars were 9  and 9 . Additional Diagnoses:   Hospital Problems as of 2020 Date Reviewed: 1/10/2015          Codes Class Noted - Resolved POA    Abdominal pain during pregnancy, third trimester ICD-10-CM: O26.893, R10.9  ICD-9-CM: 646.83, 789.00  2020 - Present Unknown             Lab Results   Component Value Date/Time    Rubella, External Immune 2020    GrBStrep, External Neg 2020    ABO,Rh O Positive 2020    There is no immunization history for the selected administration types on file for this patient. * Procedures:   * No surgery found *           * Discharge Condition: good    [facility] Course: Normal hospital course following the delivery. * Disposition: Home    Discharge Medications:   Current Discharge Medication List      START taking these medications    Details   ibuprofen (MOTRIN) 800 mg tablet Take 1 Tab by mouth every eight (8) hours as needed for Pain. Qty: 30 Tab, Refills: 1      oxyCODONE-acetaminophen (PERCOCET) 5-325 mg per tablet Take 1 Tab by mouth every four (4) hours as needed for Pain for up to 3 days. Max Daily Amount: 6 Tabs. Indications: pain  Qty: 10 Tab, Refills: 0    Associated Diagnoses: Pain         CONTINUE these medications which have NOT CHANGED    Details   NIFEdipine ER (PROCARDIA XL) 30 mg ER tablet Take 30 mg by mouth daily. prenatal 25/iron/folate 6/dha (PRENA1 PO) Take 1 Tab by mouth daily. * Follow-up Care/Patient Instructions: Activity: Activity as tolerated  Diet: Regular Diet  Wound Care:  Ice to area for comfort    Follow-up Information     Follow up With Specialties Details Why Ross Quarles MD Internal Medicine   [facility]  [address]  [facility] [address]      Kimberlee Bolivar MD Obstetrics & Gynecology Schedule an appointment as soon as possible for a visit in 2 weeks [address] [facility] [address]  . [facility] 25  [phone]

## 2020-08-27 NOTE — PROGRESS NOTES
Nurses in to do change of shift report and mom was noted to be crying. Mom had asked for formula and nipple before oncoming nurse came into room. Offgoing nurse Maggy Krishna RN gave formula with instructions to parents not to give more than 10 cc. Verbalized an understanding. Will continue to monitor   . Stated that she also wanted to continue breastfeeding. Told that if she did want to continue that she would have to keep her breast stimulated with the infant sucking . Verbalized an understanding.

## 2022-03-19 PROBLEM — R10.9 ABDOMINAL PAIN DURING PREGNANCY, THIRD TRIMESTER: Status: ACTIVE | Noted: 2020-08-24

## 2022-03-19 PROBLEM — O26.893 ABDOMINAL PAIN DURING PREGNANCY, THIRD TRIMESTER: Status: ACTIVE | Noted: 2020-08-24

## 2023-05-17 RX ORDER — NIFEDIPINE 30 MG/1
30 TABLET, EXTENDED RELEASE ORAL DAILY
COMMUNITY

## 2023-05-17 RX ORDER — IBUPROFEN 800 MG/1
800 TABLET ORAL EVERY 8 HOURS PRN
COMMUNITY
Start: 2020-08-27